# Patient Record
Sex: FEMALE | URBAN - METROPOLITAN AREA
[De-identification: names, ages, dates, MRNs, and addresses within clinical notes are randomized per-mention and may not be internally consistent; named-entity substitution may affect disease eponyms.]

---

## 2024-10-07 ENCOUNTER — OFFICE VISIT (OUTPATIENT)
Dept: URBAN - METROPOLITAN AREA CLINIC 111 | Facility: CLINIC | Age: 54
End: 2024-10-07
Payer: COMMERCIAL

## 2024-10-07 ENCOUNTER — DASHBOARD ENCOUNTERS (OUTPATIENT)
Age: 54
End: 2024-10-07

## 2024-10-07 VITALS
BODY MASS INDEX: 34.49 KG/M2 | SYSTOLIC BLOOD PRESSURE: 126 MMHG | HEART RATE: 87 BPM | TEMPERATURE: 97.7 F | DIASTOLIC BLOOD PRESSURE: 84 MMHG | WEIGHT: 202 LBS | HEIGHT: 64 IN

## 2024-10-07 DIAGNOSIS — Z90.49 S/P CHOLECYSTECTOMY: ICD-10-CM

## 2024-10-07 DIAGNOSIS — R10.11 RUQ PAIN: ICD-10-CM

## 2024-10-07 DIAGNOSIS — R12 HEARTBURN: ICD-10-CM

## 2024-10-07 PROCEDURE — 99204 OFFICE O/P NEW MOD 45 MIN: CPT | Performed by: PHYSICIAN ASSISTANT

## 2024-10-07 RX ORDER — LACTULOSE 10 G/10G
1 PACKET AS NEEDED SOLUTION ORAL ONCE A DAY
Status: ACTIVE | COMMUNITY

## 2024-10-07 RX ORDER — DICYCLOMINE HYDROCHLORIDE 10 MG/1
2 CAPSULES CAPSULE ORAL THREE TIMES A DAY
Status: ACTIVE | COMMUNITY

## 2024-10-07 RX ORDER — METHYLPREDNISOLONE 4 MG/1
1 TABLET WITH FOOD OR MILK TABLET ORAL
Status: ACTIVE | COMMUNITY

## 2024-10-07 RX ORDER — MONTELUKAST 10 MG/1
1 TABLET TABLET, FILM COATED ORAL ONCE A DAY
Status: ACTIVE | COMMUNITY

## 2024-10-07 NOTE — HPI-TODAY'S VISIT:
52 y/o female here for hospital follow up and abdominal pain. Pt went to [facility] recently for abdominal pain. CT a/p revealing no acute process, surgical clips at base of cecum from prior appendectomy, several small hypodensities in liver & R kidney (noted to be too small to further characterize), and very small pericardial effusion. Pt had prior cholecystectomy. Labs look fine. WBC mildly elevated at 10.9.  Pain is in RUQ. She has also had back pain for a few weeks. She saw ortho and has MRI spine ordered which she has next week. She is also doing PT. She went to an urgent care and had x-ray first. She was told she was constipated and was given an Rx to help. She states she could not fill it. She was then told to go to ER for a CT to r/o diverticulitis. She does not have the RUQ pain when she wakes up. It worsens throughout the day. It is hard to move around. She has had the RUQ pain since last week. She had colonoscopy in 2017 by Dr. Lema. She states it looked fine and denies polyps. She had this scope before appendectomy. She has reflux and has been on meds for a long time. She had an EGD in the past as well. Father had bile duct cancer. No weight loss. No rectal bleeding. She has had some constipation recently from psych meds. She has been on them for a "hole in her ear." No fever. She is taking NSAIDs but denies a h/o taking them excessively. Pt is stressed and reports her mother is in the hospital right now.

## 2024-10-25 ENCOUNTER — TELEPHONE ENCOUNTER (OUTPATIENT)
Dept: URBAN - METROPOLITAN AREA CLINIC 111 | Facility: CLINIC | Age: 54
End: 2024-10-25

## 2024-10-25 ENCOUNTER — LAB OUTSIDE AN ENCOUNTER (OUTPATIENT)
Dept: URBAN - METROPOLITAN AREA CLINIC 111 | Facility: CLINIC | Age: 54
End: 2024-10-25

## 2024-10-31 ENCOUNTER — WEB ENCOUNTER (OUTPATIENT)
Dept: URBAN - METROPOLITAN AREA CLINIC 111 | Facility: CLINIC | Age: 54
End: 2024-10-31

## 2024-11-04 ENCOUNTER — WEB ENCOUNTER (OUTPATIENT)
Dept: URBAN - METROPOLITAN AREA CLINIC 111 | Facility: CLINIC | Age: 54
End: 2024-11-04

## 2024-11-13 ENCOUNTER — WEB ENCOUNTER (OUTPATIENT)
Dept: URBAN - METROPOLITAN AREA CLINIC 111 | Facility: CLINIC | Age: 54
End: 2024-11-13

## 2024-12-11 ENCOUNTER — WEB ENCOUNTER (OUTPATIENT)
Dept: URBAN - METROPOLITAN AREA CLINIC 111 | Facility: CLINIC | Age: 54
End: 2024-12-11

## 2024-12-17 ENCOUNTER — LAB OUTSIDE AN ENCOUNTER (OUTPATIENT)
Dept: URBAN - METROPOLITAN AREA CLINIC 111 | Facility: CLINIC | Age: 54
End: 2024-12-17

## 2024-12-17 ENCOUNTER — OFFICE VISIT (OUTPATIENT)
Dept: URBAN - METROPOLITAN AREA CLINIC 111 | Facility: CLINIC | Age: 54
End: 2024-12-17
Payer: COMMERCIAL

## 2024-12-17 VITALS
SYSTOLIC BLOOD PRESSURE: 119 MMHG | BODY MASS INDEX: 36.88 KG/M2 | HEART RATE: 62 BPM | WEIGHT: 216 LBS | TEMPERATURE: 98.1 F | DIASTOLIC BLOOD PRESSURE: 75 MMHG | HEIGHT: 64 IN

## 2024-12-17 DIAGNOSIS — R10.11 RUQ PAIN: ICD-10-CM

## 2024-12-17 DIAGNOSIS — R12 HEARTBURN: ICD-10-CM

## 2024-12-17 PROCEDURE — 99214 OFFICE O/P EST MOD 30 MIN: CPT | Performed by: PHYSICIAN ASSISTANT

## 2024-12-17 RX ORDER — ATOGEPANT 60 MG/1
1 TABLET TABLET ORAL
Status: ACTIVE | COMMUNITY

## 2024-12-17 RX ORDER — MONTELUKAST 10 MG/1
1 TABLET TABLET, FILM COATED ORAL ONCE A DAY
Status: ACTIVE | COMMUNITY

## 2024-12-17 RX ORDER — METHYLPREDNISOLONE 4 MG/1
1 TABLET WITH FOOD OR MILK TABLET ORAL
Status: ON HOLD | COMMUNITY

## 2024-12-17 RX ORDER — SERTRALINE 25 MG/1
1 TABLET TABLET, FILM COATED ORAL
Status: ACTIVE | COMMUNITY

## 2024-12-17 RX ORDER — LACTULOSE 10 G/10G
1 PACKET AS NEEDED SOLUTION ORAL ONCE A DAY
Status: ON HOLD | COMMUNITY

## 2024-12-17 RX ORDER — DICYCLOMINE HYDROCHLORIDE 10 MG/1
2 CAPSULES CAPSULE ORAL THREE TIMES A DAY
Status: ON HOLD | COMMUNITY

## 2024-12-17 NOTE — HPI-TODAY'S VISIT:
54 y/o female here to follow up on RUQ pain. Pt was seen on 10/7 for this and heartburn. S/p CCY and appendectomy in the past. Pt takes PPI daily. She had unimpressive CT. Pain sounded musculoskeletal. Pt was nontender on exam. She had MRI spine which showed a suspected cyst in liver. RUQ u/s was ordered which was normal. She was told next step for pain would be EGD. H/o colonoscopy in 2017 with no polyps.   She is still having pain that moves around entire RUQ. It is not severe and more of a discomfort. She is taking Align. She had headaches and diarrhea after starting Zoloft. Symptoms improved after 2 weeks and she is still on it. No rectal bleeding. She just stopped Qulipta and does not know how much it was helping. She stopped PPI 2 weeks ago. She was also worried about potential long-term side effects. No heart, lung, or kidney problems other than kidney stones.

## 2025-01-29 ENCOUNTER — OFFICE VISIT (OUTPATIENT)
Dept: URBAN - METROPOLITAN AREA LAB 3 | Facility: LAB | Age: 55
End: 2025-01-29

## 2025-01-29 RX ORDER — MONTELUKAST 10 MG/1
1 TABLET TABLET, FILM COATED ORAL ONCE A DAY
Status: ACTIVE | COMMUNITY

## 2025-01-29 RX ORDER — ATOGEPANT 60 MG/1
1 TABLET TABLET ORAL
Status: ACTIVE | COMMUNITY

## 2025-01-29 RX ORDER — SERTRALINE 25 MG/1
1 TABLET TABLET, FILM COATED ORAL
Status: ACTIVE | COMMUNITY

## 2025-01-29 RX ORDER — LACTULOSE 10 G/10G
1 PACKET AS NEEDED SOLUTION ORAL ONCE A DAY
Status: ON HOLD | COMMUNITY

## 2025-01-29 RX ORDER — DICYCLOMINE HYDROCHLORIDE 10 MG/1
2 CAPSULES CAPSULE ORAL THREE TIMES A DAY
Status: ON HOLD | COMMUNITY

## 2025-01-29 RX ORDER — METHYLPREDNISOLONE 4 MG/1
1 TABLET WITH FOOD OR MILK TABLET ORAL
Status: ON HOLD | COMMUNITY

## 2025-02-12 ENCOUNTER — OFFICE VISIT (OUTPATIENT)
Dept: URBAN - METROPOLITAN AREA CLINIC 111 | Facility: CLINIC | Age: 55
End: 2025-02-12
Payer: COMMERCIAL

## 2025-02-12 VITALS
DIASTOLIC BLOOD PRESSURE: 85 MMHG | TEMPERATURE: 97.7 F | WEIGHT: 215.2 LBS | BODY MASS INDEX: 36.74 KG/M2 | HEART RATE: 69 BPM | HEIGHT: 64 IN | SYSTOLIC BLOOD PRESSURE: 123 MMHG

## 2025-02-12 DIAGNOSIS — K29.70 GASTRITIS: ICD-10-CM

## 2025-02-12 DIAGNOSIS — E66.812 CLASS 2 OBESITY: ICD-10-CM

## 2025-02-12 DIAGNOSIS — R12 HEARTBURN: ICD-10-CM

## 2025-02-12 PROCEDURE — 99213 OFFICE O/P EST LOW 20 MIN: CPT | Performed by: PHYSICIAN ASSISTANT

## 2025-02-12 RX ORDER — SERTRALINE 25 MG/1
1 TABLET TABLET, FILM COATED ORAL
Status: ACTIVE | COMMUNITY

## 2025-02-12 RX ORDER — MONTELUKAST 10 MG/1
1 TABLET TABLET, FILM COATED ORAL ONCE A DAY
Status: ACTIVE | COMMUNITY

## 2025-02-12 RX ORDER — LACTULOSE 10 G/10G
1 PACKET AS NEEDED SOLUTION ORAL ONCE A DAY
Status: ON HOLD | COMMUNITY

## 2025-02-12 RX ORDER — METHYLPREDNISOLONE 4 MG/1
1 TABLET WITH FOOD OR MILK TABLET ORAL
Status: ON HOLD | COMMUNITY

## 2025-02-12 RX ORDER — DICYCLOMINE HYDROCHLORIDE 10 MG/1
2 CAPSULES CAPSULE ORAL THREE TIMES A DAY
Status: ON HOLD | COMMUNITY

## 2025-02-12 RX ORDER — ATOGEPANT 60 MG/1
1 TABLET TABLET ORAL
Status: ON HOLD | COMMUNITY

## 2025-02-12 NOTE — HPI-TODAY'S VISIT:
55 y/o female here to follow up after EGD. Seen by me on 12/17/24 for heartburn and RUQ pain. She has had CCY and had negative U/S. She had stopped PPI d/t concern for potential long-term side effects. She was recommended to lose weight and told to discuss possibly starting a GLP-1 with PCP. S/p EGD on 1/29/25 by Dr. Durand revealing chronic gastritis. Path with no H. pylori. No Celiac.    Pain is better. She has not started weight loss med yet but was prescribed one. She has been prescribed Phentermine. She has taken NSAIDs and took them for a couple of weeks last Fall. She has alcohol at least once a week.  Previous: S/p CCY and appendectomy in the past. She had unimpressive CT. Pain sounded musculoskeletal. Pt was nontender on exam. She had MRI spine which showed a suspected cyst in liver. RUQ u/s was ordered which was normal. H/o colonoscopy in 2017 with no polyps.

## 2025-03-11 ENCOUNTER — WEB ENCOUNTER (OUTPATIENT)
Dept: URBAN - METROPOLITAN AREA CLINIC 111 | Facility: CLINIC | Age: 55
End: 2025-03-11